# Patient Record
Sex: MALE | Race: WHITE | Employment: FULL TIME | URBAN - METROPOLITAN AREA
[De-identification: names, ages, dates, MRNs, and addresses within clinical notes are randomized per-mention and may not be internally consistent; named-entity substitution may affect disease eponyms.]

---

## 2024-03-25 DIAGNOSIS — N52.9 ERECTILE DYSFUNCTION, UNSPECIFIED ERECTILE DYSFUNCTION TYPE: ICD-10-CM

## 2024-03-25 DIAGNOSIS — Z00.00 ANNUAL PHYSICAL EXAM: ICD-10-CM

## 2024-03-25 LAB
APPEARANCE UR: CLEAR
BACTERIA URNS QL MICRO: NEGATIVE /HPF
BILIRUB UR QL: NEGATIVE
CASTS URNS QL MICRO: NORMAL /LPF (ref 0–2)
COLOR UR: NORMAL
EPI CELLS #/AREA URNS HPF: NORMAL /HPF (ref 0–5)
GLUCOSE UR STRIP.AUTO-MCNC: NEGATIVE MG/DL
HGB UR QL STRIP: NEGATIVE
KETONES UR QL STRIP.AUTO: NEGATIVE MG/DL
LEUKOCYTE ESTERASE UR QL STRIP.AUTO: NEGATIVE
MUCOUS THREADS URNS QL MICRO: 0 /LPF
NITRITE UR QL STRIP.AUTO: NEGATIVE
PH UR STRIP: 5.5 (ref 5–9)
PROT UR STRIP-MCNC: NEGATIVE MG/DL
RBC #/AREA URNS HPF: NORMAL /HPF (ref 0–5)
SP GR UR REFRACTOMETRY: 1.01 (ref 1–1.02)
URINE CULTURE IF INDICATED: NORMAL
UROBILINOGEN UR QL STRIP.AUTO: 0.2 EU/DL (ref 0.2–1)
WBC URNS QL MICRO: NORMAL /HPF (ref 0–4)

## 2024-04-02 DIAGNOSIS — Z00.00 ANNUAL PHYSICAL EXAM: ICD-10-CM

## 2024-04-02 DIAGNOSIS — Z13.6 ENCOUNTER FOR SCREENING FOR CARDIOVASCULAR DISORDERS: ICD-10-CM

## 2024-04-02 DIAGNOSIS — E78.5 DYSLIPIDEMIA: ICD-10-CM

## 2024-04-02 DIAGNOSIS — I10 PRIMARY HYPERTENSION: Primary | Chronic | ICD-10-CM

## 2024-04-12 ENCOUNTER — HOSPITAL ENCOUNTER (OUTPATIENT)
Dept: CT IMAGING | Age: 49
Discharge: HOME | End: 2024-04-12

## 2024-04-12 DIAGNOSIS — Z13.6 ENCOUNTER FOR SCREENING FOR CARDIOVASCULAR DISORDERS: ICD-10-CM

## 2024-04-12 DIAGNOSIS — Z00.00 ANNUAL PHYSICAL EXAM: ICD-10-CM

## 2024-04-12 DIAGNOSIS — E78.5 DYSLIPIDEMIA: ICD-10-CM

## 2024-04-12 DIAGNOSIS — I10 PRIMARY HYPERTENSION: Chronic | ICD-10-CM

## 2024-04-12 PROCEDURE — 75571 CT HRT W/O DYE W/CA TEST: CPT

## 2024-10-07 ENCOUNTER — TELEMEDICINE (OUTPATIENT)
Dept: INTERNAL MEDICINE CLINIC | Facility: CLINIC | Age: 49
End: 2024-10-07
Payer: COMMERCIAL

## 2024-10-07 DIAGNOSIS — K21.9 GASTROESOPHAGEAL REFLUX DISEASE WITHOUT ESOPHAGITIS: Chronic | ICD-10-CM

## 2024-10-07 DIAGNOSIS — I10 PRIMARY HYPERTENSION: Chronic | ICD-10-CM

## 2024-10-07 DIAGNOSIS — K20.90 ESOPHAGITIS: ICD-10-CM

## 2024-10-07 DIAGNOSIS — R10.31 RIGHT GROIN PAIN: Primary | ICD-10-CM

## 2024-10-07 PROCEDURE — 99214 OFFICE O/P EST MOD 30 MIN: CPT | Performed by: NURSE PRACTITIONER

## 2024-10-07 RX ORDER — VALSARTAN AND HYDROCHLOROTHIAZIDE 80; 12.5 MG/1; MG/1
1 TABLET, FILM COATED ORAL DAILY
Qty: 90 TABLET | Refills: 1 | Status: SHIPPED | OUTPATIENT
Start: 2024-10-07

## 2024-10-07 ASSESSMENT — ENCOUNTER SYMPTOMS
ABDOMINAL PAIN: 0
COUGH: 0
VOMITING: 0
DIARRHEA: 0
NAUSEA: 0
SHORTNESS OF BREATH: 0

## 2024-10-07 NOTE — PROGRESS NOTES
[facility]  Office Visit Note    Subjective:  Chief Complaint   Patient presents with    Hypertension       Patient ID: Raf Putnam is a 49 y.o. male presenting to the office for the above.    Raf Putnam, was evaluated through a synchronous (real-time) audio-video encounter. The patient (or guardian if applicable) is aware that this is a billable service, which includes applicable co-pays. This Virtual Visit was conducted with patient's (and/or legal guardian's) consent. Patient identification was verified, and a caregiver was present when appropriate.   The patient was located at Other: [facility]  Provider was located at Home (Appt Dept State): [place]  Confirm you are appropriately licensed, registered, or certified to deliver care itate where the patient is located as indicated above. If you are not or unsure, please re-schedule the visit: Yes, I confirm.           49-year-old male for follow up.  Previous primary care provider was [facility].   . Has two children (19yo and 17yo).  He treats water for industrial facilities.     Right groin pain--  He has history of left-sided sciatica that flares intermittently. Has been worse in the past few weeks.  Then in the past few days he has developed pain in his right groin area, an ache and sometimes with sharp radiating pains.  No known injury.  No numbness/tingling in BLE, saddle anesthesia, weakness; no nausea/vomiting/diarrhea, fevers.  States symptoms are some better this morning.   --Check ultrasound to evaluate for hernia. If negative and symptoms continue, will schedule in office visit for evaluation.   --Advised of symptoms that would require reevaluation, or that would require immediate medical attention in the ER; patient expresses understanding.     Gastritis, esophagitis--  States he had endoscopy about 3 years ago and was told to come back in 3 years.  He takes omeprazole daily.

## 2024-10-08 ENCOUNTER — HOSPITAL ENCOUNTER (OUTPATIENT)
Dept: ULTRASOUND IMAGING | Age: 49
Discharge: HOME | End: 2024-10-11
Payer: COMMERCIAL

## 2024-10-08 DIAGNOSIS — R10.31 RIGHT GROIN PAIN: ICD-10-CM

## 2024-10-08 PROCEDURE — 76705 ECHO EXAM OF ABDOMEN: CPT

## 2025-02-04 ENCOUNTER — OFFICE VISIT (OUTPATIENT)
Age: 50
End: 2025-02-04
Payer: COMMERCIAL

## 2025-02-04 ENCOUNTER — PREP FOR PROCEDURE (OUTPATIENT)
Dept: GASTROENTEROLOGY | Age: 50
End: 2025-02-04

## 2025-02-04 VITALS
HEART RATE: 89 BPM | OXYGEN SATURATION: 97 % | RESPIRATION RATE: 18 BRPM | BODY MASS INDEX: 27 KG/M2 | WEIGHT: 172 LBS | SYSTOLIC BLOOD PRESSURE: 145 MMHG | DIASTOLIC BLOOD PRESSURE: 91 MMHG | HEIGHT: 67 IN

## 2025-02-04 DIAGNOSIS — R11.10 REGURGITATION OF FOOD: ICD-10-CM

## 2025-02-04 DIAGNOSIS — K21.9 CHRONIC GERD: Primary | ICD-10-CM

## 2025-02-04 DIAGNOSIS — K21.9 CHRONIC GERD: ICD-10-CM

## 2025-02-04 DIAGNOSIS — R49.0 DYSPHONIA: ICD-10-CM

## 2025-02-04 PROCEDURE — 99204 OFFICE O/P NEW MOD 45 MIN: CPT | Performed by: PHYSICIAN ASSISTANT

## 2025-02-04 PROCEDURE — 3080F DIAST BP >= 90 MM HG: CPT | Performed by: PHYSICIAN ASSISTANT

## 2025-02-04 PROCEDURE — 3077F SYST BP >= 140 MM HG: CPT | Performed by: PHYSICIAN ASSISTANT

## 2025-02-04 RX ORDER — SODIUM CHLORIDE 9 MG/ML
25 INJECTION, SOLUTION INTRAVENOUS PRN
Status: CANCELLED | OUTPATIENT
Start: 2025-02-04

## 2025-02-04 RX ORDER — SODIUM CHLORIDE 0.9 % (FLUSH) 0.9 %
5-40 SYRINGE (ML) INJECTION EVERY 12 HOURS SCHEDULED
Status: CANCELLED | OUTPATIENT
Start: 2025-02-04

## 2025-02-04 RX ORDER — SODIUM CHLORIDE 0.9 % (FLUSH) 0.9 %
5-40 SYRINGE (ML) INJECTION PRN
Status: CANCELLED | OUTPATIENT
Start: 2025-02-04

## 2025-02-04 NOTE — PROGRESS NOTES
Raf Putnam (:  1975) is a 49 y.o. male new patient referred to our office for evaluation of the following chief complaint(s):  New Patient        Assessment & Plan   ASSESSMENT/PLAN:  1. Chronic GERD  2. Dysphonia  3. Encounter for colonoscopy due to history of adenomatous colonic polyps  4. Regurgitation of food    -Counseled patient and provided written recommendations for diet and lifestyle modifications for GERD. Avoid tobacco, alcohol, NSAIDs.  Patient is currently prescribed Prilosec 20 mg daily.  Assessment & Plan  1. GERD: Omeprazole 20 mg daily controls symptoms. History of regurgitation and \"early Whitlock's esophagus\" noted years ago. Risk factors include longstanding tobacco abuse and daily EtOH use 6-8 beers daily. Schedule EGD.    2. Hoarseness: New onset over 2 months. Possibly acid reflux related. Follow-up after EGD. May need to consider PPI dosing adjustment and/or referral to ENT.     3. Nicotine Dependence: Quit smoking 3 years ago, continues vaping and chewing tobacco.    4. Alcohol Use Disorder: Drinks 6-8 beers daily.    5. Health Maintenance: Reviewed colonoscopy in . Based on findings recommend repeat colonoscopy 5 years (2027). This could be considered sooner for any bowel habit changes, stool pattern, rectal bleeding, or weight loss. Defer colonoscopy for now in absence of these symptoms or family hx of colorectal cancer.    Follow-up  - Post-procedure follow-up to discuss results and medication adjustments.    Results      Return for scheduled EGD, follow-up visit 1-2 weeks after procedure.         Subjective   SUBJECTIVE/OBJECTIVE  Raf Putnam is a 49 y.o. year old male referred to our office for evaluation of GERD and esophagitis. Referral note reviewed from 10/7/2024.  Prior pertinent GI evaluation includes:    -EGD 2022 (Dr. Burgess): 2 cm hiatal hernia chronic gastritis, otherwise normal; JOSE test negative, gastric biopsy showed chronic

## 2025-02-11 ENCOUNTER — TELEPHONE (OUTPATIENT)
Age: 50
End: 2025-02-11

## 2025-02-11 NOTE — TELEPHONE ENCOUNTER
Called patient to reschedule egd with Miky from 02/11/2025 to allow insurance more time to approve (due to approval still under review )  rescheduled case to 03/12/2025 [facility] with Miky

## 2025-03-06 ENCOUNTER — TELEPHONE (OUTPATIENT)
Dept: GASTROENTEROLOGY | Age: 50
End: 2025-03-06

## 2025-03-06 ENCOUNTER — TELEPHONE (OUTPATIENT)
Age: 50
End: 2025-03-06

## 2025-03-06 NOTE — TELEPHONE ENCOUNTER
Pt called requesting to cancel procedure. States \"he will reschedule later towards the end of the year\". Case message sent cancelling procedure.

## 2025-04-08 DIAGNOSIS — N52.9 ERECTILE DYSFUNCTION, UNSPECIFIED ERECTILE DYSFUNCTION TYPE: ICD-10-CM

## 2025-04-09 RX ORDER — SILDENAFIL 100 MG/1
100 TABLET, FILM COATED ORAL PRN
Qty: 30 TABLET | Refills: 1 | Status: SHIPPED | OUTPATIENT
Start: 2025-04-09

## 2025-06-13 ENCOUNTER — OFFICE VISIT (OUTPATIENT)
Dept: INTERNAL MEDICINE CLINIC | Facility: CLINIC | Age: 50
End: 2025-06-13
Payer: COMMERCIAL

## 2025-06-13 VITALS
TEMPERATURE: 98.4 F | HEART RATE: 76 BPM | SYSTOLIC BLOOD PRESSURE: 130 MMHG | HEIGHT: 68 IN | DIASTOLIC BLOOD PRESSURE: 86 MMHG | BODY MASS INDEX: 26.52 KG/M2 | OXYGEN SATURATION: 99 % | WEIGHT: 175 LBS

## 2025-06-13 DIAGNOSIS — K21.9 GASTROESOPHAGEAL REFLUX DISEASE WITHOUT ESOPHAGITIS: Chronic | ICD-10-CM

## 2025-06-13 DIAGNOSIS — I10 PRIMARY HYPERTENSION: Primary | Chronic | ICD-10-CM

## 2025-06-13 DIAGNOSIS — E78.5 DYSLIPIDEMIA: ICD-10-CM

## 2025-06-13 DIAGNOSIS — I10 PRIMARY HYPERTENSION: Chronic | ICD-10-CM

## 2025-06-13 DIAGNOSIS — Z12.5 PROSTATE CANCER SCREENING: ICD-10-CM

## 2025-06-13 PROBLEM — R11.10 REGURGITATION OF FOOD: Status: RESOLVED | Noted: 2025-02-04 | Resolved: 2025-06-13

## 2025-06-13 PROBLEM — R49.0 DYSPHONIA: Status: RESOLVED | Noted: 2025-02-04 | Resolved: 2025-06-13

## 2025-06-13 LAB
ALBUMIN SERPL-MCNC: 4.1 G/DL (ref 3.5–5)
ALBUMIN/GLOB SERPL: 1.1 (ref 1–1.9)
ALP SERPL-CCNC: 69 U/L (ref 40–129)
ALT SERPL-CCNC: 24 U/L (ref 8–55)
ANION GAP SERPL CALC-SCNC: 11 MMOL/L (ref 7–16)
AST SERPL-CCNC: 31 U/L (ref 15–37)
BASOPHILS # BLD: 0.1 K/UL (ref 0–0.2)
BASOPHILS NFR BLD: 2 % (ref 0–2)
BILIRUB SERPL-MCNC: 0.5 MG/DL (ref 0–1.2)
BUN SERPL-MCNC: 13 MG/DL (ref 6–23)
CALCIUM SERPL-MCNC: 10 MG/DL (ref 8.8–10.2)
CHLORIDE SERPL-SCNC: 98 MMOL/L (ref 98–107)
CHOLEST SERPL-MCNC: 270 MG/DL (ref 0–200)
CO2 SERPL-SCNC: 26 MMOL/L (ref 20–29)
CREAT SERPL-MCNC: 0.85 MG/DL (ref 0.8–1.3)
DIFFERENTIAL METHOD BLD: NORMAL
EOSINOPHIL # BLD: 0.18 K/UL (ref 0–0.8)
EOSINOPHIL NFR BLD: 3.5 % (ref 0.5–7.8)
ERYTHROCYTE [DISTWIDTH] IN BLOOD BY AUTOMATED COUNT: 12.5 % (ref 11.9–14.6)
GLOBULIN SER CALC-MCNC: 3.8 G/DL (ref 2.3–3.5)
GLUCOSE SERPL-MCNC: 114 MG/DL (ref 70–99)
HCT VFR BLD AUTO: 45.2 % (ref 41.1–50.3)
HDLC SERPL-MCNC: 75 MG/DL (ref 40–60)
HDLC SERPL: 3.6 (ref 0–5)
HGB BLD-MCNC: 15.2 G/DL (ref 13.6–17.2)
IMM GRANULOCYTES # BLD AUTO: 0.01 K/UL (ref 0–0.5)
IMM GRANULOCYTES NFR BLD AUTO: 0.2 % (ref 0–5)
LDLC SERPL CALC-MCNC: 175 MG/DL (ref 0–100)
LYMPHOCYTES # BLD: 1.63 K/UL (ref 0.5–4.6)
LYMPHOCYTES NFR BLD: 31.9 % (ref 13–44)
MCH RBC QN AUTO: 31.7 PG (ref 26.1–32.9)
MCHC RBC AUTO-ENTMCNC: 33.6 G/DL (ref 31.4–35)
MCV RBC AUTO: 94.4 FL (ref 82–102)
MONOCYTES # BLD: 0.41 K/UL (ref 0.1–1.3)
MONOCYTES NFR BLD: 8 % (ref 4–12)
NEUTS SEG # BLD: 2.78 K/UL (ref 1.7–8.2)
NEUTS SEG NFR BLD: 54.4 % (ref 43–78)
NRBC # BLD: 0 K/UL (ref 0–0.2)
PLATELET # BLD AUTO: 246 K/UL (ref 150–450)
PMV BLD AUTO: 10.1 FL (ref 9.4–12.3)
POTASSIUM SERPL-SCNC: 3.9 MMOL/L (ref 3.5–5.1)
PROT SERPL-MCNC: 7.9 G/DL (ref 6.3–8.2)
PSA SERPL-MCNC: 1.1 NG/ML (ref 0–4)
RBC # BLD AUTO: 4.79 M/UL (ref 4.23–5.6)
SODIUM SERPL-SCNC: 136 MMOL/L (ref 136–145)
TRIGL SERPL-MCNC: 99 MG/DL (ref 0–150)
VLDLC SERPL CALC-MCNC: 20 MG/DL (ref 6–23)
WBC # BLD AUTO: 5.1 K/UL (ref 4.3–11.1)

## 2025-06-13 PROCEDURE — 99214 OFFICE O/P EST MOD 30 MIN: CPT | Performed by: NURSE PRACTITIONER

## 2025-06-13 PROCEDURE — 4004F PT TOBACCO SCREEN RCVD TLK: CPT | Performed by: NURSE PRACTITIONER

## 2025-06-13 PROCEDURE — 3017F COLORECTAL CA SCREEN DOC REV: CPT | Performed by: NURSE PRACTITIONER

## 2025-06-13 PROCEDURE — G8427 DOCREV CUR MEDS BY ELIG CLIN: HCPCS | Performed by: NURSE PRACTITIONER

## 2025-06-13 PROCEDURE — G8419 CALC BMI OUT NRM PARAM NOF/U: HCPCS | Performed by: NURSE PRACTITIONER

## 2025-06-13 PROCEDURE — 3080F DIAST BP >= 90 MM HG: CPT | Performed by: NURSE PRACTITIONER

## 2025-06-13 PROCEDURE — 3077F SYST BP >= 140 MM HG: CPT | Performed by: NURSE PRACTITIONER

## 2025-06-13 RX ORDER — VALSARTAN AND HYDROCHLOROTHIAZIDE 80; 12.5 MG/1; MG/1
1 TABLET, FILM COATED ORAL DAILY
Qty: 90 TABLET | Refills: 1 | Status: SHIPPED | OUTPATIENT
Start: 2025-06-13

## 2025-06-13 SDOH — ECONOMIC STABILITY: FOOD INSECURITY: WITHIN THE PAST 12 MONTHS, YOU WORRIED THAT YOUR FOOD WOULD RUN OUT BEFORE YOU GOT MONEY TO BUY MORE.: NEVER TRUE

## 2025-06-13 SDOH — ECONOMIC STABILITY: FOOD INSECURITY: WITHIN THE PAST 12 MONTHS, THE FOOD YOU BOUGHT JUST DIDN'T LAST AND YOU DIDN'T HAVE MONEY TO GET MORE.: NEVER TRUE

## 2025-06-13 ASSESSMENT — ENCOUNTER SYMPTOMS
COUGH: 0
ABDOMINAL PAIN: 0
VOMITING: 0
NAUSEA: 0
DIARRHEA: 0
WHEEZING: 0
SHORTNESS OF BREATH: 0

## 2025-06-13 ASSESSMENT — PATIENT HEALTH QUESTIONNAIRE - PHQ9
SUM OF ALL RESPONSES TO PHQ QUESTIONS 1-9: 0
SUM OF ALL RESPONSES TO PHQ QUESTIONS 1-9: 0
1. LITTLE INTEREST OR PLEASURE IN DOING THINGS: NOT AT ALL
SUM OF ALL RESPONSES TO PHQ QUESTIONS 1-9: 0
SUM OF ALL RESPONSES TO PHQ QUESTIONS 1-9: 0
2. FEELING DOWN, DEPRESSED OR HOPELESS: NOT AT ALL

## 2025-06-13 NOTE — PROGRESS NOTES
further questions or concerns at this visit.  Encouraged to contact office with any concerns prior to next visit. Advise patient to notify office if they do not receive results of any labs or tests ordered within 2-3 days of the lab/test.     Return in about 6 months (around 12/13/2025), or if symptoms worsen or fail to improve, for Follow up, Fasting labs.    Jayashree Lyon, APRN - CNP

## 2025-06-16 ENCOUNTER — RESULTS FOLLOW-UP (OUTPATIENT)
Dept: INTERNAL MEDICINE CLINIC | Facility: CLINIC | Age: 50
End: 2025-06-16